# Patient Record
Sex: FEMALE | Race: WHITE | Employment: FULL TIME | URBAN - METROPOLITAN AREA
[De-identification: names, ages, dates, MRNs, and addresses within clinical notes are randomized per-mention and may not be internally consistent; named-entity substitution may affect disease eponyms.]

---

## 2020-10-14 NOTE — PATIENT INSTRUCTIONS
How to Manage Your Symptoms  [facility]   The following information is to help guide you through the different symptoms that you may experience during your recovery.  Symptom management is designed to give you tips to assist you in decr For additional information or to make an appointment please call the office at [facility]: [phone]     How to [address]   Most common symptoms after a concussion:   Headache:   • May vary in loc depend on injury severity and whether this was your first concussion.        Post concussion symptoms:   • Mild headache that won't go away  • Pressure in the neck or head  • Nausea and or vomiting  • Vision problems  • Hearing problems (ringing in the ears TV, and listening to music. Driving   Do not drive for at least 24 hours. Do not drive if your symptoms prevent you from being safe behind the wheel. Pain Relief   Do not use pain medications other than acetominophen unless recommended by your doctor.

## 2020-10-14 NOTE — PROGRESS NOTES
[address] [facility]  NEW PATIENT EVALUATION    Consultation as a request of Dr. Tina Sanchez.     Chief Complaint: Patient presents with:  Head Neck Injury: Pt presents for concussion evaluation - pt had concussion 9/19/20 wh goes to bed towards the end of the night and improves when she wakes up in the morning.   Although, patient is endorsing that she is not sleeping according to her normal schedule of 7 hours a night and has only been sleeping a maximum of 5 hours and some ni the injury?: Yes  Returned back full duty this week  Can you read or concentrate without having any difficulty?  Yes  Are you more fatigued during the day than normal?: Yes: Attributes it to less sleep, <5 hours/day due to busy schedule at work  Changes in scale of 0-6) - None(0), mild, moderate, severe(6):    Headache 0   \"Pressure in the head\" 5   Neck pain 0   Nausea or vomiting 0   Dizziness 0   Blurred vision 0   Balance problems 0   Sensitivity to light 2   Sensitivity to noise 0   Feeling slow down palpation of posterior occiput:  No  Peripheral Pulses - no cyanosis  Neurologic: sensory grossly intact, motor grossly intact, reflexes normal and symmetrical and cranial nerves II-XII normal    Balance Error Scoring System (BONI)  Firm Surface:  Double L

## 2020-10-15 PROBLEM — F07.81 POST CONCUSSIVE SYNDROME: Status: ACTIVE | Noted: 2020-10-15

## 2020-10-26 NOTE — PROGRESS NOTES
[address] of [address] Verbal Consent   I conducted a telehealth visit with Bruna Ortega today, 10/26/20, which was completed using two-way, real-time interactive audio an started having sinusitis and chills. She is feeling a head pressure in the frontal aspect of her head, as well as nose and ears. She had a fever of 100 degress today and has been staying at home. She went to immediate care.  She has started Augmentin for si Alcohol use: Not Currently    Drug use: Never         REVIEW OF SYSTEMS:     Review of Systems/Current symptoms:   (All graded on a scale of 0-6) - None(0), mild, moderate, severe(6):    Headache 5   \"Pressure in the head\" 3   Neck pain 0   Nausea or vom ANIONGAP, GFR, GFRNAA, GFRAA, CA, OSMOCALC, ALKPHO, AST, ALT, ALKPHOS, BILT, TP, ALB, GLOBULT, GLOBULIN, AGRATIO, ALBGLOBRAT, NA, K, CL, CO2  No results found for: PTP, PT, INR  No results found for: VITD, QVITD, VITD25, [id number]    IMAGING:   None       A can follow-up in office as well when appropriate. In the meantime, I will be available for telephone and video encounter. The patient verbalized understanding with this plan and was in agreement. There are no barriers to learning.   All questions wer

## 2020-10-28 ENCOUNTER — TELEPHONE (OUTPATIENT)
Dept: PHYSICAL THERAPY | Facility: HOSPITAL | Age: 35
End: 2020-10-28

## 2020-10-28 ENCOUNTER — OFFICE VISIT (OUTPATIENT)
Dept: PHYSICAL THERAPY | Facility: HOSPITAL | Age: 35
End: 2020-10-28
Attending: PHYSICAL MEDICINE & REHABILITATION
Payer: COMMERCIAL

## 2020-10-28 DIAGNOSIS — F07.81 POST CONCUSSIVE SYNDROME: ICD-10-CM

## 2020-10-28 PROCEDURE — 97161 PT EVAL LOW COMPLEX 20 MIN: CPT

## 2020-10-28 NOTE — PATIENT INSTRUCTIONS
Use blue light filter from website provided. Mandatory rest breaks from screen during every 30-45 minutes. Exercise to tolerance. Do not allow symptoms to elevate more than a couple points more than where they are when you start.  For example, if yo

## 2020-10-28 NOTE — PROGRESS NOTES
CONCUSSION EVALUATION:   Referring Physician: Dr. Rebecca Lundborg  Diagnosis: post-concussion      Date of Onset: per HPI Date of Service: 10/28/2020     PATIENT SUMMARY   Jean-Paul Fung is a 30year old y/o female who presents to therapy today following concuss with recent hx of concussion resulting in pressure in head especially with work tasks. Oculomotor exam is normal and asymptomatic. She denies dizziness in general. Pt challenged by balance with vision eliminated and altered LOUIS.  Education provided as below turns   __3____4. Gait with vertical head turns  __3____5. Gait and pivot turn  __3____6. Step over obstacle  __3____7. Gait with narrow base of support-  # of steps__10__  __2____8.  Gait with eyes closed-  Time: __6.5___seconds (path deviation, good postu Yes  I certify the need for these services furnished under this plan of treatment and while under my care.     X___________________________________________________ Date____________________    Certification From: 22/80/0211  To:1/26/2021

## 2020-11-09 ENCOUNTER — APPOINTMENT (OUTPATIENT)
Dept: PHYSICAL THERAPY | Facility: HOSPITAL | Age: 35
End: 2020-11-09
Attending: PHYSICAL MEDICINE & REHABILITATION
Payer: COMMERCIAL

## 2020-11-09 ENCOUNTER — TELEPHONE (OUTPATIENT)
Dept: PHYSICAL THERAPY | Facility: HOSPITAL | Age: 35
End: 2020-11-09

## 2020-11-11 ENCOUNTER — APPOINTMENT (OUTPATIENT)
Dept: PHYSICAL THERAPY | Facility: HOSPITAL | Age: 35
End: 2020-11-11
Attending: PHYSICAL MEDICINE & REHABILITATION
Payer: COMMERCIAL

## 2020-11-16 ENCOUNTER — APPOINTMENT (OUTPATIENT)
Dept: PHYSICAL THERAPY | Facility: HOSPITAL | Age: 35
End: 2020-11-16
Attending: PHYSICAL MEDICINE & REHABILITATION
Payer: COMMERCIAL

## 2020-11-16 ENCOUNTER — TELEPHONE (OUTPATIENT)
Dept: PHYSICAL THERAPY | Age: 35
End: 2020-11-16

## 2020-11-25 ENCOUNTER — APPOINTMENT (OUTPATIENT)
Dept: PHYSICAL THERAPY | Facility: HOSPITAL | Age: 35
End: 2020-11-25
Attending: PHYSICAL MEDICINE & REHABILITATION
Payer: COMMERCIAL

## 2020-11-30 ENCOUNTER — APPOINTMENT (OUTPATIENT)
Dept: PHYSICAL THERAPY | Facility: HOSPITAL | Age: 35
End: 2020-11-30
Attending: PHYSICAL MEDICINE & REHABILITATION
Payer: COMMERCIAL

## 2020-12-07 ENCOUNTER — APPOINTMENT (OUTPATIENT)
Dept: PHYSICAL THERAPY | Facility: HOSPITAL | Age: 35
End: 2020-12-07
Attending: PHYSICAL MEDICINE & REHABILITATION
Payer: COMMERCIAL

## 2020-12-14 ENCOUNTER — APPOINTMENT (OUTPATIENT)
Dept: PHYSICAL THERAPY | Facility: HOSPITAL | Age: 35
End: 2020-12-14
Attending: PHYSICAL MEDICINE & REHABILITATION
Payer: COMMERCIAL

## 2020-12-21 ENCOUNTER — APPOINTMENT (OUTPATIENT)
Dept: PHYSICAL THERAPY | Facility: HOSPITAL | Age: 35
End: 2020-12-21
Attending: PHYSICAL MEDICINE & REHABILITATION
Payer: COMMERCIAL

## 2024-11-08 ENCOUNTER — APPOINTMENT (OUTPATIENT)
Dept: GENERAL RADIOLOGY | Age: 39
End: 2024-11-08
Attending: Physician Assistant
Payer: COMMERCIAL

## 2024-11-08 ENCOUNTER — HOSPITAL ENCOUNTER (OUTPATIENT)
Age: 39
Discharge: HOME | End: 2024-11-08
Payer: COMMERCIAL

## 2024-11-08 VITALS
BODY MASS INDEX: 21.16 KG/M2 | HEIGHT: 62 IN | TEMPERATURE: 97 F | DIASTOLIC BLOOD PRESSURE: 70 MMHG | RESPIRATION RATE: 18 BRPM | SYSTOLIC BLOOD PRESSURE: 121 MMHG | OXYGEN SATURATION: 100 % | WEIGHT: 115 LBS | HEART RATE: 68 BPM

## 2024-11-08 DIAGNOSIS — R07.9 CHEST PAIN OF UNCERTAIN ETIOLOGY: Primary | ICD-10-CM

## 2024-11-08 LAB
#MXD IC: 0.4 X10ˆ3/UL (ref 0.1–1)
ATRIAL RATE: 74 BPM
B-HCG UR QL: NEGATIVE
BUN BLD-MCNC: 12 MG/DL (ref 7–18)
CHLORIDE BLD-SCNC: 102 MMOL/L (ref 98–112)
CO2 BLD-SCNC: 24 MMOL/L (ref 21–32)
CREAT BLD-MCNC: 0.7 MG/DL
DDIMER WHOLE BLOOD: <200 NG/ML DDU (ref ?–400)
EGFRCR SERPLBLD CKD-EPI 2021: 113 ML/MIN/1.73M2 (ref 60–?)
GLUCOSE BLD-MCNC: 122 MG/DL (ref 70–99)
HCT VFR BLD AUTO: 37.5 %
HCT VFR BLD CALC: 41 %
HGB BLD-MCNC: 12.2 G/DL
ISTAT IONIZED CALCIUM FOR CHEM 8: 1.23 MMOL/L (ref 1.12–1.32)
LYMPHOCYTES # BLD AUTO: 1.6 X10ˆ3/UL (ref 1–4)
LYMPHOCYTES NFR BLD AUTO: 29 %
MCH RBC QN AUTO: 29.8 PG (ref 26–34)
MCHC RBC AUTO-ENTMCNC: 32.5 G/DL (ref 31–37)
MCV RBC AUTO: 91.5 FL (ref 80–100)
MIXED CELL %: 6.6 %
NEUTROPHILS # BLD AUTO: 3.4 X10ˆ3/UL (ref 1.5–7.7)
NEUTROPHILS NFR BLD AUTO: 64.4 %
P AXIS: 46 DEGREES
P-R INTERVAL: 138 MS
PLATELET # BLD AUTO: 188 X10ˆ3/UL (ref 150–450)
POTASSIUM BLD-SCNC: 3.5 MMOL/L (ref 3.6–5.1)
Q-T INTERVAL: 370 MS
QRS DURATION: 74 MS
QTC CALCULATION (BEZET): 410 MS
R AXIS: 26 DEGREES
RBC # BLD AUTO: 4.1 X10ˆ6/UL
SODIUM BLD-SCNC: 138 MMOL/L (ref 136–145)
T AXIS: 41 DEGREES
TROPONIN I BLD-MCNC: <0.02 NG/ML
VENTRICULAR RATE: 74 BPM
WBC # BLD AUTO: 5.4 X10ˆ3/UL (ref 4–11)

## 2024-11-08 PROCEDURE — 81025 URINE PREGNANCY TEST: CPT | Performed by: PHYSICIAN ASSISTANT

## 2024-11-08 PROCEDURE — 84484 ASSAY OF TROPONIN QUANT: CPT | Performed by: PHYSICIAN ASSISTANT

## 2024-11-08 PROCEDURE — 93000 ELECTROCARDIOGRAM COMPLETE: CPT | Performed by: PHYSICIAN ASSISTANT

## 2024-11-08 PROCEDURE — 71046 X-RAY EXAM CHEST 2 VIEWS: CPT | Performed by: PHYSICIAN ASSISTANT

## 2024-11-08 PROCEDURE — 99204 OFFICE O/P NEW MOD 45 MIN: CPT | Performed by: PHYSICIAN ASSISTANT

## 2024-11-08 PROCEDURE — 80047 BASIC METABLC PNL IONIZED CA: CPT | Performed by: PHYSICIAN ASSISTANT

## 2024-11-08 PROCEDURE — 85025 COMPLETE CBC W/AUTO DIFF WBC: CPT | Performed by: PHYSICIAN ASSISTANT

## 2024-11-08 NOTE — ED INITIAL ASSESSMENT (HPI)
Pt presents to the IC with c/o left sided chest pain with radiation to the left arm and tingling to the left 1st-3rd fingers since last night. Pt felt like the left hand  wasn't as strong as the right. +nausea yesterday that resolved. Pt then worked out, but only on her legs.

## 2024-11-08 NOTE — DISCHARGE INSTRUCTIONS
Drink plenty of water and get plenty of rest   Eat a well-balanced diet   Follow up with your primary care provider and cardiology

## 2024-11-08 NOTE — ED PROVIDER NOTES
Chief Complaint   Patient presents with    Chest Pain       History obtained from: patient   services not used     HPI:     Shabnam Soria is a 39 year old female who presents with left-sided chest pain since 6 PM last night.  Patient states pain started when she was sitting at home on her computer.  Patient reports constant pain localized to left side of chest today.  Patient also notes tingling sensation in left arm.  Patient states she felt nauseous yesterday however this has resolved.  Denies injury/trauma, shortness of breath, dizziness, lightheadedness, syncope, palpitations, headache, vomiting.  Denies recent stressors.  Denies past medical history or prescription medications.     PMH  History reviewed. No pertinent past medical history.    PFSH    PFS asessment screens reviewed and agree.  Nurses notes reviewed I agree with documentation.    No family history on file.  Family history reviewed with patient/caregiver and is not pertinent to presenting problem.  Social History     Socioeconomic History    Marital status:      Spouse name: Not on file    Number of children: Not on file    Years of education: Not on file    Highest education level: Not on file   Occupational History    Not on file   Tobacco Use    Smoking status: Never    Smokeless tobacco: Never   Substance and Sexual Activity    Alcohol use: Not Currently    Drug use: Never    Sexual activity: Not on file   Other Topics Concern    Caffeine Concern No    Exercise No    Seat Belt Not Asked    Special Diet Not Asked    Stress Concern Not Asked    Weight Concern Not Asked     Service Not Asked    Blood Transfusions Not Asked    Occupational Exposure Not Asked    Hobby Hazards Not Asked    Sleep Concern Not Asked    Back Care Not Asked    Bike Helmet Not Asked    Self-Exams Not Asked   Social History Narrative    The patient does not use an assistive device..      The patient does live in a home with stairs.     Social  Drivers of Health     Financial Resource Strain: Not on file   Food Insecurity: Not on file   Transportation Needs: Not on file   Physical Activity: Not on file   Stress: Not on file   Social Connections: Not on file   Housing Stability: Low Risk  (4/21/2024)    Received from [facility]    Housing Stability     Mortgage Payment Concerns?: Not on file     Number of Places Lived in the Last Year: Not on file     Unstable Housing?: Not on file         ROS:   Positive for stated complaint: Chest pain  All other systems reviewed and negative except as noted above.  Constitutional and Vital Signs Reviewed.    Physical Exam:     Findings:    /70   Pulse 68   Temp 97.2 °F (36.2 °C) (Temporal)   Resp 18   Ht 157.5 cm (5' 2\")   Wt 52.2 kg   LMP 11/01/2024   SpO2 100%   BMI 21.03 kg/m²   GENERAL: well developed, no acute distress, non-toxic appearing   SKIN: good skin turgor, no obvious rashes  HEAD: normocephalic, atraumatic  EYES: sclera non-icteric bilaterally, conjunctiva clear bilaterally, PERRLA, EOMs intact   OROPHARYNX: MMM, pharynx clear, no exudates or swelling, uvula midline, no tongue elevation, maintaining airway and secretions  NECK: supple, no lymphadenopathy, no nuchal rigidity, no trismus, no edema, phonation normal    CARDIO: RRR, normal heart sounds, radial pulse 2+ bilaterally, cap refill < 2 sec   LUNGS: clear to auscultation bilaterally, no increased WOB, no rales, rhonchi, or wheezes  GI: abdomen soft and non-tender   EXTREMITIES: no cyanosis or edema, WILSON without difficulty  NEURO: Cranial nerves II through XII intact, finger-to-nose coordination intact, no palmar drift, BUE and BLE strength 5/5 and sensation intact to light touch, ambulating with steady gait  PSYCH: alert and oriented x3, answering questions appropriately, mood appropriate    MDM/Assessment/Plan:   Orders for this encounter:    Orders Placed This Encounter    XR CHEST PA + LAT CHEST (CPT=71046)      Chest Pain Pt presents to the IC with c/o left sided chest pain with radiation to the   left arm and tingling to the left 1st-3rd fingers since last night. Pt   felt like the left hand  wasn't as strong as the right. +nausea   yesterday that resolved. Pt then worked out, but only on her legs.        Order Specific Question:   What is the Relevant Clinical Indication / Reason for Exam?     Answer:   Chest Pain     Order Specific Question:   Release to patient     Answer:   Immediate    D-Dimer,Triage     Order Specific Question:   Release to patient     Answer:   Immediate    POCT CBC     Order Specific Question:   Release to patient     Answer:   Immediate    EKG 12 Lead     Order Specific Question:   Release to patient     Answer:   Immediate    POCT ISTAT chem8 cartridge    POCT Pregnancy, Urine    ISTAT Troponin    POCT Pregnancy, Urine    iStat (Chem 8)    iStat (Troponin)       Labs performed this visit:  Recent Results (from the past 10 hours)   EKG 12 Lead    Collection Time: 11/08/24  2:05 PM   Result Value Ref Range    Ventricular rate 74 BPM    Atrial rate 74 BPM    P-R Interval 138 ms    QRS Duration 74 ms    Q-T Interval 370 ms    QTC Calculation (Bezet) 410 ms    P Axis 46 degrees    R Axis 26 degrees    T Axis 41 degrees   D-Dimer,Triage    Collection Time: 11/08/24  2:31 PM   Result Value Ref Range    D-Dimer DDU <200 <400 ng/mL DDU   POCT CBC    Collection Time: 11/08/24  2:31 PM   Result Value Ref Range    WBC IC 5.4 4.0 - 11.0 x10ˆ3/uL    RBC IC 4.10 3.80 - 5.30 X10ˆ6/uL    HGB IC 12.2 12.0 - 16.0 g/dL    HCT IC 37.5 35.0 - 48.0 %    MCV IC 91.5 80.0 - 100.0 fL    MCH IC 29.8 26.0 - 34.0 pg    MCHC IC 32.5 31.0 - 37.0 g/dL    PLT .0 150.0 - 450.0 X10ˆ3/uL    # Neutrophil 3.4 1.5 - 7.7 X10ˆ3/uL    # Lymphocyte 1.6 1.0 - 4.0 X10ˆ3/uL    # Mixed Cells 0.4 0.1 - 1.0 X10ˆ3/uL    Neutrophil % 64.4 %    Lymphocyte % 29.0 %    Mixed Cell % 6.6 %   POCT ISTAT chem8 cartridge    Collection Time:  11/08/24  2:38 PM   Result Value Ref Range    ISTAT Sodium 138 136 - 145 mmol/L    ISTAT BUN 12 7 - 18 mg/dL    ISTAT Potassium 3.5 (L) 3.6 - 5.1 mmol/L    ISTAT Chloride 102 98 - 112 mmol/L    ISTAT Ionized Calcium 1.23 1.12 - 1.32 mmol/L    ISTAT Hematocrit 41 34 - 50 %    ISTAT Glucose 122 (H) 70 - 99 mg/dL    ISTAT TCO2 24 21 - 32 mmol/L    ISTAT Creatinine 0.70 0.55 - 1.02 mg/dL    eGFR-Cr 113 >=60 mL/min/1.73m2   POCT Pregnancy, Urine    Collection Time: 11/08/24  2:40 PM   Result Value Ref Range    POCT Urine Pregnancy Negative Negative   ISTAT Troponin    Collection Time: 11/08/24  2:40 PM   Result Value Ref Range    ISTAT Troponin <0.02 <0.045 ng/mL ng/mL       Imaging performed this visit:  XR CHEST PA + LAT CHEST (CPT=71046)   Final Result   PROCEDURE: XR CHEST PA + LAT CHEST (CPT=71046)       COMPARISON: None.       INDICATIONS: Left sided chest pain radiating down left arm since    yesterday. No injury.       TECHNIQUE:   Two views.         FINDINGS:    CARDIAC/VASC: No cardiac silhouette abnormality or cardiomegaly.     Unremarkable pulmonary vasculature.     MEDIAST/EDILMA: No visible mass or adenopathy.    LUNGS/PLEURA: No significant pulmonary parenchymal abnormalities.  No    effusion or pleural thickening.     BONES: Mild chronic appearing deformity of the right mid clavicle.   OTHER: Negative.                     =====   CONCLUSION: No acute cardiopulmonary disease.               Dictated by (CST): Aaron Walker MD on 11/08/2024 at 2:48 PM        Finalized by (CST): Aaron Walker MD on 11/08/2024 at 2:49 PM                   Medical Decision Making  DDx includes anxiety versus panic attack versus ACS versus PE versus arrhythmia versus costochondritis versus other.  Patient is overall very well-appearing with stable vitals and tolerating oral intake.  No hypoxia or signs of respiratory distress.  Given left-sided chest pain ongoing since 6 PM last night, recommend further evaluation in the  IC setting including EKG, chest x-ray, basic labs, troponin, and D-dimer.  Patient agreeable to this plan.    EKG reviewed, normal sinus rhythm, rate 74, low voltage QRS, no ST elevation.  Chest x-ray reviewed, no evidence of acute cardiopulmonary process.  CBC reviewed, grossly unremarkable without evidence of infection or anemia.  BMP reviewed, notable for very mild hypokalemia of 3.5, otherwise grossly unremarkable without evidence of further electrolyte derangements or kidney dysfunction.  Troponin within normal limits.  D-dimer within normal limits.    On reassessment, patient is resting comfortably and remains well-appearing.  Vitals remained stable.  No new or worsening symptoms throughout IC stay.  Discussed all results in detail with patient who is reassured by these findings.  Discussed supportive care including rest, increased water intake, and eating a well-balanced diet.  Instructed patient to go directly to nearest ER with any worsening or concerning symptoms.  Follow-up with PCP and cardiology.    Discussed case with supervising attending Dr. Valera who is in agreement with assessment and plan.    Amount and/or Complexity of Data Reviewed  Labs: ordered.  Radiology: ordered and independent interpretation performed.  ECG/medicine tests: ordered and independent interpretation performed.          Diagnosis:    ICD-10-CM    1. Chest pain of uncertain etiology  R07.9 XR CHEST PA + LAT CHEST (CPT=71046)     XR CHEST PA + LAT CHEST (CPT=71046)          All results reviewed and discussed with patient/patient's family. Patient/patient's family verbalize excellent understanding of instructions and feels comfortable with plan. All of patient's/patient's family's questions were addressed.   See AVS for detailed discharge instructions for your condition today.    Follow Up with:  Parris Khan DO  [address] [address]  [phone]          Gianluca Leo DO  [address]  [place]  [address]  [facility] [address]  [phone]      Cardiology      Note: This document was dictated using Dragon medical dictation software.  Proofreading was performed to the best of my ability, but errors may be present.    Amanda Yancey PA-C

## 2025-07-31 ENCOUNTER — LAB ENCOUNTER (OUTPATIENT)
Dept: LAB | Age: 40
End: 2025-07-31
Attending: INTERNAL MEDICINE

## 2025-07-31 DIAGNOSIS — K12.0 ORAL APHTHAE: Primary | ICD-10-CM

## 2025-07-31 DIAGNOSIS — Z00.00 ROUTINE GENERAL MEDICAL EXAMINATION AT A HEALTH CARE FACILITY: ICD-10-CM

## 2025-07-31 DIAGNOSIS — D50.0 IRON DEFICIENCY ANEMIA SECONDARY TO BLOOD LOSS (CHRONIC): ICD-10-CM

## 2025-07-31 LAB
DEPRECATED HBV CORE AB SER IA-ACNC: 7 NG/ML (ref 50–306)
DEPRECATED RDW RBC AUTO: 43.5 FL (ref 35.1–46.3)
ERYTHROCYTE [DISTWIDTH] IN BLOOD BY AUTOMATED COUNT: 13.2 % (ref 11–15)
FOLATE SERPL-MCNC: 13.6 NG/ML (ref 5.4–?)
HCT VFR BLD AUTO: 36.3 % (ref 35–48)
HCV AB SERPL QL IA: NONREACTIVE
HGB BLD-MCNC: 11.9 G/DL (ref 12–16)
IRON SATN MFR SERPL: 15 % (ref 15–50)
IRON SERPL-MCNC: 61 UG/DL (ref 50–170)
MCH RBC QN AUTO: 29.2 PG (ref 26–34)
MCHC RBC AUTO-ENTMCNC: 32.8 G/DL (ref 31–37)
MCV RBC AUTO: 89.2 FL (ref 80–100)
PLATELET # BLD AUTO: 180 10(3)UL (ref 150–450)
RBC # BLD AUTO: 4.07 X10(6)UL (ref 3.8–5.3)
T PALLIDUM AB SER QL IA: NONREACTIVE
TOTAL IRON BINDING CAPACITY: 401 UG/DL (ref 250–425)
TRANSFERRIN SERPL-MCNC: 328 MG/DL (ref 250–380)
VIT B12 SERPL-MCNC: 402 PG/ML (ref 211–911)
VIT D+METAB SERPL-MCNC: 12.4 NG/ML (ref 30–100)
WBC # BLD AUTO: 3.3 X10(3) UL (ref 4–11)

## 2025-07-31 PROCEDURE — 87389 HIV-1 AG W/HIV-1&-2 AB AG IA: CPT

## 2025-07-31 PROCEDURE — 83540 ASSAY OF IRON: CPT

## 2025-07-31 PROCEDURE — 85027 COMPLETE CBC AUTOMATED: CPT

## 2025-07-31 PROCEDURE — 86803 HEPATITIS C AB TEST: CPT

## 2025-07-31 PROCEDURE — 86780 TREPONEMA PALLIDUM: CPT

## 2025-07-31 PROCEDURE — 82746 ASSAY OF FOLIC ACID SERUM: CPT

## 2025-07-31 PROCEDURE — 84466 ASSAY OF TRANSFERRIN: CPT

## 2025-07-31 PROCEDURE — 82607 VITAMIN B-12: CPT

## 2025-07-31 PROCEDURE — 36415 COLL VENOUS BLD VENIPUNCTURE: CPT

## 2025-07-31 PROCEDURE — 87591 N.GONORRHOEAE DNA AMP PROB: CPT

## 2025-07-31 PROCEDURE — 82306 VITAMIN D 25 HYDROXY: CPT

## 2025-07-31 PROCEDURE — 82728 ASSAY OF FERRITIN: CPT

## 2025-07-31 PROCEDURE — 87491 CHLMYD TRACH DNA AMP PROBE: CPT

## 2025-08-01 LAB
C TRACH DNA SPEC QL NAA+PROBE: NEGATIVE
N GONORRHOEA DNA SPEC QL NAA+PROBE: NEGATIVE

## (undated) NOTE — LETTER
10/14/20          Joshua Silva  :  1985      To Whom It May Concern: This patient was seen in our office on 10/14/20. Work status:  May return to work full-time with the following restrictions: frequent breaks to stretch, decreased work load